# Patient Record
Sex: MALE | Race: WHITE | NOT HISPANIC OR LATINO | Employment: FULL TIME | URBAN - METROPOLITAN AREA
[De-identification: names, ages, dates, MRNs, and addresses within clinical notes are randomized per-mention and may not be internally consistent; named-entity substitution may affect disease eponyms.]

---

## 2018-03-26 RX ORDER — PAROXETINE HYDROCHLORIDE 20 MG/1
1 TABLET, FILM COATED ORAL DAILY
COMMUNITY
Start: 2017-07-25 | End: 2019-04-09 | Stop reason: SDUPTHER

## 2018-03-29 ENCOUNTER — OFFICE VISIT (OUTPATIENT)
Dept: UROLOGY | Facility: MEDICAL CENTER | Age: 28
End: 2018-03-29
Payer: COMMERCIAL

## 2018-03-29 VITALS
DIASTOLIC BLOOD PRESSURE: 60 MMHG | WEIGHT: 145 LBS | BODY MASS INDEX: 24.16 KG/M2 | SYSTOLIC BLOOD PRESSURE: 124 MMHG | HEIGHT: 65 IN

## 2018-03-29 DIAGNOSIS — F52.4 PREMATURE EJACULATION: Primary | ICD-10-CM

## 2018-03-29 LAB
SL AMB  POCT GLUCOSE, UA: NEGATIVE
SL AMB LEUKOCYTE ESTERASE,UA: NEGATIVE
SL AMB POCT BILIRUBIN,UA: NEGATIVE
SL AMB POCT BLOOD,UA: NEGATIVE
SL AMB POCT CLARITY,UA: CLEAR
SL AMB POCT COLOR,UA: YELLOW
SL AMB POCT KETONES,UA: NEGATIVE
SL AMB POCT NITRITE,UA: NEGATIVE
SL AMB POCT PH,UA: 5.5
SL AMB POCT SPECIFIC GRAVITY,UA: 1.02
SL AMB POCT URINE PROTEIN: NEGATIVE
SL AMB POCT UROBILINOGEN: 0.2

## 2018-03-29 PROCEDURE — 99213 OFFICE O/P EST LOW 20 MIN: CPT | Performed by: UROLOGY

## 2018-03-29 PROCEDURE — 81003 URINALYSIS AUTO W/O SCOPE: CPT | Performed by: UROLOGY

## 2018-03-29 RX ORDER — PAROXETINE HYDROCHLORIDE 20 MG/1
20 TABLET, FILM COATED ORAL DAILY
Qty: 90 TABLET | Refills: 3 | Status: SHIPPED | OUTPATIENT
Start: 2018-03-29 | End: 2019-04-09 | Stop reason: SDUPTHER

## 2018-03-29 NOTE — LETTER
2018     Ileana Philip[address]  [address] [facility]    Patient: Mihaela Victoria   YOB: 1990   Date of Visit: 3/29/2018       Dear Dr Wren Para: Thank you for referring Christiana Stauffer to me for evaluation  Below are my notes for this consultation  If you have questions, please do not hesitate to call me  I look forward to following your patient along with you  Sincerely,        Gerry Baldwin MD        CC: No Recipients  Gerry Baldwin MD  3/29/2018  8:47 AM  Sign at close encounter  [address] [facility]  [address], [address] [place] [address]  [phone]  www  [facility]  org      NAME: Mihaela Victoria  AGE: 28 y o  SEX: male  : 1990   MRN: [id number]    DATE: 3/29/2018  TIME: 8:34 AM    Assessment and Plan:  Continues to do well with Paxil  Will continue this at the 20 mg dose  F/u 1 year for recheck/med refill  Chief Complaint   No chief complaint on file  History of Present Illness   Years of premature ejaculation, currently managed with Paxil  No adverse effects with the Paxil  He continues to work and he is here for his yearly prescription refill  The following portions of the patient's history were reviewed and updated as appropriate: allergies, current medications, past family history, past medical history, past social history, past surgical history and problem list     Review of Systems   Review of Systems   Eyes: Negative for visual disturbance  Genitourinary: Negative  Active Problem List   There is no problem list on file for this patient  Objective   /60 (BP Location: Left arm, Patient Position: Sitting)   Ht 5' 5" (1 651 m)   Wt 65 8 kg (145 lb)   BMI 24 13 kg/m²      Physical Exam   Constitutional: He is oriented to person, place, and time  He appears well-developed and well-nourished     HENT:   Head: Normocephalic and atraumatic  Eyes: EOM are normal    Neck: Normal range of motion  Pulmonary/Chest: Effort normal    Musculoskeletal: Normal range of motion  Neurological: He is alert and oriented to person, place, and time  Skin: Skin is warm and dry  Psychiatric: He has a normal mood and affect   His behavior is normal  Judgment and thought content normal            Current Medications     Current Outpatient Prescriptions:     PARoxetine (PAXIL) 20 mg tablet, Take 1 tablet by mouth daily, Disp: , Rfl:         Jamee Nolan MD

## 2018-03-29 NOTE — PROGRESS NOTES
[address] [facility]  [facility]  [address] [facility] [place]  [place], [address] [facility]  [phone]  www  [facility]  org      NAME: Nakul Jeffers  AGE: 28 y o  SEX: male  : 1990   MRN: [id number]    DATE: 3/29/2018  TIME: 8:34 AM    Assessment and Plan:  Continues to do well with Paxil  Will continue this at the 20 mg dose  F/u 1 year for recheck/med refill  Chief Complaint   No chief complaint on file  History of Present Illness   Years of premature ejaculation, currently managed with Paxil  No adverse effects with the Paxil  He continues to work and he is here for his yearly prescription refill  The following portions of the patient's history were reviewed and updated as appropriate: allergies, current medications, past family history, past medical history, past social history, past surgical history and problem list     Review of Systems   Review of Systems   Eyes: Negative for visual disturbance  Genitourinary: Negative  Active Problem List   There is no problem list on file for this patient  Objective   /60 (BP Location: Left arm, Patient Position: Sitting)   Ht 5' 5" (1 651 m)   Wt 65 8 kg (145 lb)   BMI 24 13 kg/m²     Physical Exam   Constitutional: He is oriented to person, place, and time  He appears well-developed and well-nourished  HENT:   Head: Normocephalic and atraumatic  Eyes: EOM are normal    Neck: Normal range of motion  Pulmonary/Chest: Effort normal    Musculoskeletal: Normal range of motion  Neurological: He is alert and oriented to person, place, and time  Skin: Skin is warm and dry  Psychiatric: He has a normal mood and affect   His behavior is normal  Judgment and thought content normal            Current Medications     Current Outpatient Prescriptions:     PARoxetine (PAXIL) 20 mg tablet, Take 1 tablet by mouth daily, Disp: , Rfl:         Anabelle Chao MD

## 2019-04-09 ENCOUNTER — OFFICE VISIT (OUTPATIENT)
Dept: UROLOGY | Facility: MEDICAL CENTER | Age: 29
End: 2019-04-09
Payer: COMMERCIAL

## 2019-04-09 VITALS
WEIGHT: 140 LBS | DIASTOLIC BLOOD PRESSURE: 60 MMHG | BODY MASS INDEX: 23.32 KG/M2 | HEIGHT: 65 IN | SYSTOLIC BLOOD PRESSURE: 112 MMHG

## 2019-04-09 DIAGNOSIS — F52.4 PREMATURE EJACULATION: Primary | ICD-10-CM

## 2019-04-09 LAB
SL AMB  POCT GLUCOSE, UA: NEGATIVE
SL AMB LEUKOCYTE ESTERASE,UA: NEGATIVE
SL AMB POCT BILIRUBIN,UA: NEGATIVE
SL AMB POCT BLOOD,UA: NEGATIVE
SL AMB POCT CLARITY,UA: CLEAR
SL AMB POCT COLOR,UA: YELLOW
SL AMB POCT KETONES,UA: NEGATIVE
SL AMB POCT NITRITE,UA: NEGATIVE
SL AMB POCT PH,UA: 6.5
SL AMB POCT SPECIFIC GRAVITY,UA: 1.02
SL AMB POCT URINE PROTEIN: NEGATIVE
SL AMB POCT UROBILINOGEN: 0.2

## 2019-04-09 PROCEDURE — 99213 OFFICE O/P EST LOW 20 MIN: CPT | Performed by: UROLOGY

## 2019-04-09 PROCEDURE — 81003 URINALYSIS AUTO W/O SCOPE: CPT | Performed by: UROLOGY

## 2019-04-09 RX ORDER — PAROXETINE HYDROCHLORIDE 20 MG/1
20 TABLET, FILM COATED ORAL DAILY
Qty: 90 TABLET | Refills: 3 | Status: SHIPPED | OUTPATIENT
Start: 2019-04-09 | End: 2020-06-30

## 2020-06-29 DIAGNOSIS — F52.4 PREMATURE EJACULATION: ICD-10-CM

## 2020-06-30 RX ORDER — PAROXETINE HYDROCHLORIDE 20 MG/1
TABLET, FILM COATED ORAL
Qty: 90 TABLET | Refills: 3 | Status: SHIPPED | OUTPATIENT
Start: 2020-06-30 | End: 2021-06-15

## 2021-06-15 DIAGNOSIS — F52.4 PREMATURE EJACULATION: ICD-10-CM

## 2021-06-15 RX ORDER — PAROXETINE HYDROCHLORIDE 20 MG/1
TABLET, FILM COATED ORAL
Qty: 90 TABLET | Refills: 3 | Status: SHIPPED | OUTPATIENT
Start: 2021-06-15

## 2022-10-06 DIAGNOSIS — F52.4 PREMATURE EJACULATION: ICD-10-CM

## 2022-10-10 RX ORDER — PAROXETINE HYDROCHLORIDE 20 MG/1
TABLET, FILM COATED ORAL
Qty: 90 TABLET | Refills: 3 | Status: SHIPPED | OUTPATIENT
Start: 2022-10-10

## 2023-03-13 ENCOUNTER — TELEPHONE (OUTPATIENT)
Dept: UROLOGY | Facility: AMBULATORY SURGERY CENTER | Age: 33
End: 2023-03-13

## 2023-03-13 NOTE — TELEPHONE ENCOUNTER
New Patient    What is the reason for the patient’s appointment? Patient calling to schedule VAS consult     What office location does the patient prefer? [place]     Imaging/Lab Results:    Do we accept the patient's insurance or is the patient Self-Pay? Yes blue cross  Insurance Provider:  Plan Type/Number:  Member ID#: Has the patient had any previous Urologist(s)? Previous patient of Dr Kennedi Dawson over 3 years ago  Have patient records been requested? If not are records showing in Epic:     Has the patient had any outside testing done? n/a    Does the patient have a personal history of cancer? No    Patient scheduled on 4/27 at 8:30 with Larry Sherwood in [facility]

## 2023-04-03 ENCOUNTER — TELEPHONE (OUTPATIENT)
Dept: UROLOGY | Facility: MEDICAL CENTER | Age: 33
End: 2023-04-03

## 2023-04-27 ENCOUNTER — OFFICE VISIT (OUTPATIENT)
Dept: UROLOGY | Facility: MEDICAL CENTER | Age: 33
End: 2023-04-27

## 2023-04-27 VITALS
WEIGHT: 131 LBS | DIASTOLIC BLOOD PRESSURE: 70 MMHG | SYSTOLIC BLOOD PRESSURE: 100 MMHG | BODY MASS INDEX: 21.05 KG/M2 | HEIGHT: 66 IN | HEART RATE: 59 BPM

## 2023-04-27 DIAGNOSIS — Z30.2 ENCOUNTER FOR VASECTOMY: Primary | ICD-10-CM

## 2023-04-27 DIAGNOSIS — Z98.52 STATUS POST VASECTOMY: ICD-10-CM

## 2023-04-27 RX ORDER — LORAZEPAM 1 MG/1
1 TABLET ORAL ONCE
Qty: 1 TABLET | Refills: 0 | Status: SHIPPED | OUTPATIENT
Start: 2023-04-27 | End: 2023-04-27

## 2023-04-27 NOTE — PROGRESS NOTES
4/27/2023    Assessment and Plan    34 y o  male managed by Dr Mercedez Abebe    1  Encounter for Vasectomy Evaluation   · Discussed shaving prior to procedure  · Prescription for lorazepam ordered to be taken 1 hour prior to office visit procedure  · Post vas semen analysis ordered  · Patient education material provided  · Procedure consent signed  Answered questions to patient satisfaction  · Follow-up in the office with Dr Cornelius Pierre for procedure    The patient presents requesting elective sterilization vasectomy  We discussed that vasectomy is in operation performed in the office in order to provide elective sterilization  This procedure should be considered a permanent option  Although there are subspecialists who perform vasectomy reversals, these operations are not 100% successful and are often not covered by insurance meaning they can come with a large out-of-pocket cost  The patient understands this  We reviewed the procedure in depth  Risk and benefits of the procedure were discussed and reviewed  Informed consent was obtained in the office today  The patient was prescribed a benzodiazepine to take one hour prior to the procedure to assist with his comfort  He understands that he will require transportation to and from the office that day if he is to use the benzodiazepine  He also understands he will require  semen analysis testing at 8 weeks post procedure to ensure full sterilization  In the interim, he will require contraception during intercourse to avoid an undesired pregnancy  Usually, patients are out of work for 2-3 days  We recommend tight fitting scrotal support following the procedure along with ice packs applied to the scrotum 15 minutes on and 15 minutes off for the first 24 hours  We discussed that we do send the patient home with short course of anti-inflammatory and/or narcotic pain medication  After this discussion, the patient agrees to proceed   We will schedule him in the near future  He agrees to oral sedative prior to procedure  History of Present Illness  Dena Chavarria is a 34 y o  male here for follow up evaluation of vasectomy evaluation  Patient denies previous history of injury or trauma to the groin, scrotum and testicles  He denies fathering any children  He reports being in a relationship where both partners are very questing mutual permanent sterility  Patient reports currently using condoms as this form of contraception  Patient reports working as a  with labor-intensive work  He denies all lower urinary tract symptoms including dysuria, hematuria, Noe frequency and urgency  He reports sensation of complete emptying with urination  He denies erectile dysfunction  Patient denies taking anticoagulants and denies the frequent use of nonsteroidal anti-inflammatory agents  He denies an allergy to lidocaine/Marcaine, Betadine  Review of Systems   Constitutional: Negative for chills and fever  Respiratory: Negative for cough and shortness of breath  Cardiovascular: Negative for chest pain  Gastrointestinal: Negative for abdominal distention, abdominal pain, blood in stool, nausea and vomiting  Genitourinary: Negative for difficulty urinating, dysuria, enuresis, flank pain, frequency, hematuria and urgency  Skin: Negative for rash  Past Medical History  Past Medical History:   Diagnosis Date   • Depression    • Frequent urination    • Hematuria    • Polydipsia    • Premature ejaculation        Past Social History  History reviewed  No pertinent surgical history    Social History     Tobacco Use   Smoking Status Never   Smokeless Tobacco Never       Past Family History  Family History   Problem Relation Age of Onset   • Nephrolithiasis Mother        Past Social history  Social History     Socioeconomic History   • Marital status: Single     Spouse name: Not on file   • Number of children: Not on file   • Years of education: Not on "file   • Highest education level: Not on file   Occupational History   • Not on file   Tobacco Use   • Smoking status: Never   • Smokeless tobacco: Never   Substance and Sexual Activity   • Alcohol use: No   • Drug use: No   • Sexual activity: Not on file   Other Topics Concern   • Not on file   Social History Narrative   • Not on file     Social Determinants of Health     Financial Resource Strain: Not on file   Food Insecurity: Not on file   Transportation Needs: Not on file   Physical Activity: Not on file   Stress: Not on file   Social Connections: Not on file   Intimate Partner Violence: Not on file   Housing Stability: Not on file       Current Medications  Current Outpatient Medications   Medication Sig Dispense Refill   • LORazepam (ATIVAN) 1 mg tablet Take 1 tablet (1 mg total) by mouth 1 (one) time for 1 dose Take 1 hour before office procedure 1 tablet 0   • PARoxetine (PAXIL) 20 mg tablet TAKE 1 TABLET BY MOUTH EVERY DAY 90 tablet 3     No current facility-administered medications for this visit  Allergies  No Known Allergies      The following portions of the patient's history were reviewed and updated as appropriate: allergies, current medications, past medical history, past social history, past surgical history and problem list       Vitals  Vitals:    04/27/23 0836   BP: 100/70   Pulse: 59   Weight: 59 4 kg (131 lb)   Height: 5' 6\" (1 676 m)           Physical Exam  Physical Exam  Vitals reviewed  Constitutional:       General: He is not in acute distress  Appearance: Normal appearance  He is normal weight  HENT:      Head: Normocephalic  Eyes:      Pupils: Pupils are equal, round, and reactive to light  Cardiovascular:      Rate and Rhythm: Normal rate  Pulmonary:      Effort: No respiratory distress  Breath sounds: Normal breath sounds  Abdominal:      Hernia: There is no hernia in the left inguinal area or right inguinal area     Genitourinary:     Penis: Normal and " circumcised  Testes:         Right: Mass, tenderness, swelling, testicular hydrocele or varicocele not present  Left: Mass, tenderness, swelling, testicular hydrocele or varicocele not present  Epididymis:      Right: Normal       Left: Normal    Lymphadenopathy:      Lower Body: No right inguinal adenopathy  No left inguinal adenopathy  Skin:     General: Skin is warm and dry  Neurological:      General: No focal deficit present  Mental Status: He is alert and oriented to person, place, and time  Psychiatric:         Mood and Affect: Mood normal          Behavior: Behavior normal            Results  No results found for this or any previous visit (from the past 1 hour(s))  ]  No results found for: PSA  No results found for: GLUCOSE, CALCIUM, NA, K, CO2, CL, BUN, CREATININE  No results found for: WBC, HGB, HCT, MCV, PLT        Orders  Orders Placed This Encounter   Procedures   • Semen analysis, post-vasectomy     This is a patient instruction: Please call Central Scheduling at [phone] to make an appointment for testing  Please refer to patient instructions on the Post Vasectomy Form provided to you by your doctor  If you have additional questions on collection of your specimen, please call the Lab Call Center at [phone]           Standing Status:   Future     Standing Expiration Date:   4/27/2024       JEN Jones

## 2023-06-08 ENCOUNTER — PROCEDURE VISIT (OUTPATIENT)
Dept: UROLOGY | Facility: MEDICAL CENTER | Age: 33
End: 2023-06-08
Payer: COMMERCIAL

## 2023-06-08 VITALS
BODY MASS INDEX: 21.38 KG/M2 | HEIGHT: 66 IN | WEIGHT: 133 LBS | HEART RATE: 94 BPM | OXYGEN SATURATION: 99 % | DIASTOLIC BLOOD PRESSURE: 80 MMHG | SYSTOLIC BLOOD PRESSURE: 118 MMHG

## 2023-06-08 DIAGNOSIS — Z30.2 ENCOUNTER FOR STERILIZATION: Primary | ICD-10-CM

## 2023-06-08 PROCEDURE — 88302 TISSUE EXAM BY PATHOLOGIST: CPT | Performed by: PATHOLOGY

## 2023-06-08 PROCEDURE — 55250 REMOVAL OF SPERM DUCT(S): CPT | Performed by: UROLOGY

## 2023-06-08 NOTE — PROGRESS NOTES
Vasectomy     Date/Time 6/8/2023 8:30 AM     Performed by  Michelle Nascimento MD   Authorized by Michelle Nascimento MD     Universal Protocol   Consent: Verbal consent obtained  Written consent obtained  Risks and benefits: risks, benefits and alternatives were discussed  Consent given by: patient  Patient understanding: patient states understanding of the procedure being performed  Patient consent: the patient's understanding of the procedure matches consent given  Procedure consent: procedure consent matches procedure scheduled  Required items: required blood products, implants, devices, and special equipment available  Patient identity confirmed: verbally with patient        Local anesthesia used: yes      Anesthesia: local infiltration and nerve block     Anesthesia   Local anesthesia used: yes  Local Anesthetic: lidocaine 2% without epinephrine  Anesthetic total: 10 mL     Sedation   Patient sedated: no        Specimen: yes    Culture: no   Procedure Details   Procedure Notes: I spoke to the patient concerning elective vasectomy and reviewed the procedure step-by-step answered all patient questions discussed potential risks and complications of bleeding infection recanalization persistent or recurrent fertility failure rate of 1 in 2000 testicular atrophy hematoma formation  The patient expressed understanding and reaffirmed previously signed informed consent verbally  He was placed on the examining table in the supine position and a rubber band was placed on the phallus retracting it to the patient's shirt for cephalad retraction and then the genitalia was prepped using Betadine  Lidocaine 2% without epinephrine was used to raise an anterior scrotal skin wheal and the needle was then advanced along each spermatic cord and vas deferens to perform bilateral spermatic cord blocks    Sharp hemostat was used to make a puncture wound in the anterior scrotal wall in the midline and the surgical clamp was used to grasp first the right than the left vas deferens bringing them up to the skin surface where the sharp hemostat was used to dissect away adventitia  A segment of vas deferens was removed bilaterally approximately 0 5 cm in length and using the needle tip electrode the cautery was used to provide intraluminal fulguration both on the abdominal as well as the testicular and bilaterally  Fascial interposition or tissue interposition was accomplished by docking the abdominal ends underneath surrounding adventitial tissue and applying a surgical clip  There was no bleeding  All tissue was then returned to the scrotal contents and a Band-Aid was placed over the puncture wound with the rubber band immediately being removed atraumatically  The patient recovered uneventfully and there were no complications  Segments of the vas deferens were sent for identification to pathology    The patient recovered uneventfully and left this in good condition  Patient tolerance: patient tolerated the procedure well with no immediate complications

## 2023-06-08 NOTE — LETTER
June 8, 2023     Alexander Elba[address]  [facility] [facility]    Patient: Jonni Habermann   YOB: 1990   Date of Visit: 6/8/2023       Dear Dr Lee Lennox: Thank you for referring Carmen Garza to me for evaluation  Below are my notes for this consultation  If you have questions, please do not hesitate to call me  I look forward to following your patient along with you  Sincerely,        David Celestin MD        CC: No Recipients    David Celestin MD  6/8/2023  8:36 AM  Sign when Signing Visit        Vasectomy     Date/Time 6/8/2023 8:30 AM     Performed by  David Celestin MD   Authorized by David Celestin MD     Universal Protocol   Consent: Verbal consent obtained  Written consent obtained  Risks and benefits: risks, benefits and alternatives were discussed  Consent given by: patient  Patient understanding: patient states understanding of the procedure being performed  Patient consent: the patient's understanding of the procedure matches consent given  Procedure consent: procedure consent matches procedure scheduled  Required items: required blood products, implants, devices, and special equipment available  Patient identity confirmed: verbally with patient        Local anesthesia used: yes      Anesthesia: local infiltration and nerve block     Anesthesia   Local anesthesia used: yes  Local Anesthetic: lidocaine 2% without epinephrine  Anesthetic total: 10 mL     Sedation   Patient sedated: no        Specimen: yes    Culture: no   Procedure Details   Procedure Notes: I spoke to the patient concerning elective vasectomy and reviewed the procedure step-by-step answered all patient questions discussed potential risks and complications of bleeding infection recanalization persistent or recurrent fertility failure rate of 1 in 2000 testicular atrophy hematoma formation    The patient expressed understanding and reaffirmed previously signed informed consent verbally  He was placed on the examining table in the supine position and a rubber band was placed on the phallus retracting it to the patient's shirt for cephalad retraction and then the genitalia was prepped using Betadine  Lidocaine 2% without epinephrine was used to raise an anterior scrotal skin wheal and the needle was then advanced along each spermatic cord and vas deferens to perform bilateral spermatic cord blocks  Sharp hemostat was used to make a puncture wound in the anterior scrotal wall in the midline and the surgical clamp was used to grasp first the right than the left vas deferens bringing them up to the skin surface where the sharp hemostat was used to dissect away adventitia  A segment of vas deferens was removed bilaterally approximately 0 5 cm in length and using the needle tip electrode the cautery was used to provide intraluminal fulguration both on the abdominal as well as the testicular and bilaterally  Fascial interposition or tissue interposition was accomplished by docking the abdominal ends underneath surrounding adventitial tissue and applying a surgical clip  There was no bleeding  All tissue was then returned to the scrotal contents and a Band-Aid was placed over the puncture wound with the rubber band immediately being removed atraumatically  The patient recovered uneventfully and there were no complications  Segments of the vas deferens were sent for identification to pathology    The patient recovered uneventfully and left this in good condition  Patient tolerance: patient tolerated the procedure well with no immediate complications

## 2023-06-08 NOTE — PATIENT INSTRUCTIONS
Vasectomy   WHAT YOU NEED TO KNOW:   A vasectomy is a procedure to make you sterile  It is a permanent form of birth control  The vas deferens (sperm tubes) are cut so that the semen does not contain sperm  DISCHARGE INSTRUCTIONS:   Seek care immediately if:   Your incision wound comes apart  You see blood in your urine or semen  Contact your healthcare provider or surgeon if:   You have a fever  Your wound is red, swollen, or draining pus  You feel pain or burning when you urinate  You have worsening pain in your scrotum, even after you take medicine  You have questions or concerns about your condition or care  Medicines:   NSAIDs  help decrease swelling and pain or fever  This medicine is available with or without a doctor's order  NSAIDs can cause stomach bleeding or kidney problems in certain people  If you take blood thinner medicine, always ask your healthcare provider if NSAIDs are safe for you  Always read the medicine label and follow directions  Take your medicine as directed  Contact your healthcare provider if you think your medicine is not helping or if you have side effects  Tell your provider if you are allergic to any medicine  Keep a list of the medicines, vitamins, and herbs you take  Include the amounts, and when and why you take them  Bring the list or the pill bottles to follow-up visits  Carry your medicine list with you in case of an emergency  Self-care:   Care for your wound as directed  Do not shower for 24 hours  When you do shower, carefully wash the wound with soap and water  Pat the area dry gently  Do not swim or take a bath for at least 1 week  Limit activity  for at least 2 days  Do not play sports, do yard work, or lift anything heavy for at least 2 weeks  Talk to your healthcare provider about when you can return to work  Apply ice  on your scrotum for 15 to 20 minutes every hour for 2 days   Use an ice pack, or put crushed ice in a plastic bag  Cover it with a towel  Ice helps prevent tissue damage and decreases swelling and pain  Wear an athletic supporter for at least 2 days  This will decrease pain and swelling, and protect your wound  Place a cushion such as a washcloth or small towel under your scrotum to elevate it  Do not have sex for at least 1 week  You will not be sterile right away after a vasectomy  It will take time for all the sperm to be cleared from your body  You may need to ejaculate about 20 times or wait up to 3 months for the sperm to clear  Use another form of birth control during this time  Follow up with your healthcare provider as directed: Ask when to return to have your semen checked for sperm  Write down your questions so you remember to ask them during your visits  © Copyright [employer] 2022 Information is for End User's use only and may not be sold, redistributed or otherwise used for commercial purposes  The above information is an  only  It is not intended as medical advice for individual conditions or treatments  Talk to your doctor, nurse or pharmacist before following any medical regimen to see if it is safe and effective for you

## 2023-06-13 PROCEDURE — 88302 TISSUE EXAM BY PATHOLOGIST: CPT | Performed by: PATHOLOGY

## 2023-08-24 ENCOUNTER — APPOINTMENT (OUTPATIENT)
Dept: LAB | Facility: HOSPITAL | Age: 33
End: 2023-08-24
Payer: COMMERCIAL

## 2023-08-24 DIAGNOSIS — Z98.52 STATUS POST VASECTOMY: ICD-10-CM

## 2023-08-24 LAB
DEPRECATED CD4 CELLS/CD8 CELLS BLD: 1.5 ML
SPERM MOTILE SMN QL MICRO: ABNORMAL

## 2023-08-24 PROCEDURE — 89321 SEMEN ANAL SPERM DETECTION: CPT

## 2023-08-28 ENCOUNTER — NURSE TRIAGE (OUTPATIENT)
Dept: OTHER | Facility: OTHER | Age: 33
End: 2023-08-28

## 2023-08-28 NOTE — TELEPHONE ENCOUNTER
Regarding: post vasectomy results  ----- Message from [employer] sent at 8/28/2023  7:46 PM EDT -----  " I just saw my results for my post vasectomy and I'm a little concerned it says something like nothing was found, but abnormal."

## 2023-08-29 NOTE — TELEPHONE ENCOUNTER
Reason for Disposition  • Caller requesting lab results    Additional Information  • Lab result questions    Protocols used: PCP CALL - NO TRIAGE-ADULT-AH, INFORMATION ONLY CALL - NO TRIAGE-ADULT-AH    Pt calling concerned about semen analysis since results showed as abnormal. Per Agusto's note, informed pt that he is considered sterile. Pt verbalized understanding.

## 2023-10-21 DIAGNOSIS — F52.4 PREMATURE EJACULATION: ICD-10-CM

## 2023-10-23 RX ORDER — PAROXETINE HYDROCHLORIDE 20 MG/1
TABLET, FILM COATED ORAL
Qty: 90 TABLET | Refills: 1 | Status: SHIPPED | OUTPATIENT
Start: 2023-10-23

## 2024-08-08 DIAGNOSIS — F52.4 PREMATURE EJACULATION: ICD-10-CM

## 2024-08-09 RX ORDER — PAROXETINE HYDROCHLORIDE 20 MG/1
20 TABLET, FILM COATED ORAL DAILY
Qty: 90 TABLET | Refills: 0 | Status: SHIPPED | OUTPATIENT
Start: 2024-08-09